# Patient Record
Sex: FEMALE | HISPANIC OR LATINO | URBAN - METROPOLITAN AREA
[De-identification: names, ages, dates, MRNs, and addresses within clinical notes are randomized per-mention and may not be internally consistent; named-entity substitution may affect disease eponyms.]

---

## 2023-06-12 ENCOUNTER — TRANSCRIBE ORDERS (OUTPATIENT)
Dept: RADIOLOGY | Facility: HOSPITAL | Age: 53
End: 2023-06-12

## 2023-06-12 DIAGNOSIS — D25.9 UTERINE LEIOMYOMA, UNSPECIFIED LOCATION: ICD-10-CM

## 2023-06-12 DIAGNOSIS — N92.3 INTERMENSTRUAL HEAVY BLEEDING: Primary | ICD-10-CM

## 2023-06-27 ENCOUNTER — TELEPHONE (OUTPATIENT)
Dept: VASCULAR SURGERY | Facility: CLINIC | Age: 53
End: 2023-06-27

## 2023-06-29 ENCOUNTER — TELEMEDICINE (OUTPATIENT)
Dept: INTERVENTIONAL RADIOLOGY/VASCULAR | Facility: CLINIC | Age: 53
End: 2023-06-29
Payer: COMMERCIAL

## 2023-06-29 DIAGNOSIS — N93.9 ABNORMAL UTERINE BLEEDING DUE TO SUBMUCOUSAL LEIOMYOMA OF UTERUS: Primary | ICD-10-CM

## 2023-06-29 DIAGNOSIS — D25.0 ABNORMAL UTERINE BLEEDING DUE TO SUBMUCOUSAL LEIOMYOMA OF UTERUS: Primary | ICD-10-CM

## 2023-06-29 PROCEDURE — G2012 BRIEF CHECK IN BY MD/QHP: HCPCS | Performed by: INTERNAL MEDICINE

## 2023-06-29 RX ORDER — DEXAMETHASONE SODIUM PHOSPHATE 10 MG/ML
10 INJECTION, SOLUTION INTRAMUSCULAR; INTRAVENOUS ONCE
OUTPATIENT
Start: 2023-06-29 | End: 2023-06-29

## 2023-06-29 RX ORDER — KETOROLAC TROMETHAMINE 30 MG/ML
30 INJECTION, SOLUTION INTRAMUSCULAR; INTRAVENOUS ONCE
OUTPATIENT
Start: 2023-06-29 | End: 2023-06-29

## 2023-06-29 NOTE — LETTER
June 29, 2023     Kyle Cardona MD  [address]    Patient: Cori Madison   YOB: 1970   Date of Visit: 6/29/2023       Dear Dr Denice Benjamin: Thank you for referring Cori Madison to me for evaluation  Below are my notes for this consultation  If you have questions, please do not hesitate to call me  I look forward to following your patient along with you  Sincerely,        Susanne Mukherjee MD        CC: No Recipients    Susanne Mukherjee MD  6/29/2023 10:46 AM  Incomplete  Virtual Brief Visit    This Visit is being completed by telephone  The Patient is located at Home and in the following state in which I hold an active license [place]    The patient was identified by name and date of birth  Cori Madison was informed that this is a telemedicine visit and that the visit is being conducted through Telephone  My office door was closed  No one else was in the room  She acknowledged consent and understanding of privacy and security of the video platform  The patient has agreed to participate and understands they can discontinue the visit at any time  Patient is aware this is a billable service  Assessment/Plan:  Patient is a 48year-old female with history of abnormal bleeding referred by her ob/gyn for uterine fibroid embolization  Patient states having bleeding most days throughout the past year, which range from spotting to occasional heavy bleeding  Patient states having bulk symptoms of occasional sharp pelvic pain and constipation  She denies any urinary frequency  Otherwise, the patient states she feels like her usual self and denies any symptoms  Patient has never been pregnant  The benefits, risks, and alternatives of uterine fibroid embolization was described to the patient  The risks of bleeding, infection, sloughing of fibroid into the endometrial cavity, and post-procedural pain was described   Patient was told that it may take weeks to months to see full benefits from uterine artery embolization  The alternatives of hysterectomy and conservative management were also discussed with the patient  Patient wishes to proceed with the procedure  Due to longevity of the abnormal uterine bleeding in the past year, further workup for endometrial cancer was recommended to the patient  I will contact the referring ob/gyn to see if a pap smear and endometrial biopsy was ever performed  The recent prior pelvic ultrasound report was reviewed, however, the images were not available  Regardless, a pelvic MRI will need to be performed to further evaluate for endometrial or uterine cancer prior to the embolization procedure, as well as to look at the enhancement of the fibroid  Given the length of time to the next available procedure time in 1-2 months, I will schedule the patient while we await further work-up  Latvian interpretation was utilized during this discussion (interpretor #[id number])  Please contact me via e-mail at [email or web address] if there are any questions or concerns  Yoly Lawrence MD  Interventional Radiology  Problem List Items Addressed This Visit    None      Recent Visits  Date Type Provider Dept   06/27/23 Telephone [facility] recent visits within past 7 days and meeting all other requirements  Future Appointments  No visits were found meeting these conditions  Showing future appointments within next 150 days and meeting all other requirements         Visit Time  Total Visit Duration: 30 minutes             Janis Arce MD  6/29/2023  9:27 AM  Sign when Signing Visit    Virtual Brief Visit    This Visit is being completed by telephone  The Patient is located at Home and in the following state in which I hold an active license [place]    The patient was identified by name and date of birth   Jeremiah Eastonn was informed that this is a telemedicine visit and that the visit is being conducted through Telephone  My office door was closed  No one else was in the room  She acknowledged consent and understanding of privacy and security of the video platform  The patient has agreed to participate and understands they can discontinue the visit at any time  Patient is aware this is a billable service  Assessment/Plan:  Patient is a 48year-old female with history of abnormal bleeding referred by her ob/gyn for uterine fibroid embolization  Patient states having bleeding most days throughout the past year, which range from spotting to occasional heavy bleeding  Patient states having bulk symptoms of occasional sharp pelvic pain and constipation  She denies any urinary frequency  Otherwise, the patient states she feels like her usual self and denies any symptoms  Patient has never been pregnant  The benefits, risks, and alternatives of uterine fibroid embolization was described to the patient  The risks of bleeding, infection, sloughing of fibroid into the endometrial cavity, and post-procedural pain was described  Patient was told that it may take weeks to months to see full benefits from uterine artery embolization  The alternatives of hysterectomy and conservative management were also discussed with the patient  Patient wishes to proceed with the procedure  Due to longevity of the abnormal uterine bleeding in the past year, further workup for endometrial cancer was recommended to the patient  I will contact the referring ob/gyn to see if a pap smear and endometrial biopsy was ever performed  The recent prior pelvic ultrasound report was reviewed, however, the images were not available  Regardless, a pelvic MRI will need to be performed to further evaluate for endometrial or uterine cancer prior to the embolization procedure  Gogo Wright MD  Interventional Radiology      Problem List Items Addressed This Visit    None      Recent Visits  Date Type Provider Dept   06/27/23 Telephone [facility]   Showing recent visits within past 7 days and meeting all other requirements  Future Appointments  No visits were found meeting these conditions    Showing future appointments within next 150 days and meeting all other requirements         Visit Time  Total Visit Duration: 30 minutes

## 2023-06-29 NOTE — PROGRESS NOTES
Virtual Brief Visit    This Visit is being completed by telephone  The Patient is located at Home and in the following state in which I hold an active license [place]    The patient was identified by name and date of birth  Echo Dumas was informed that this is a telemedicine visit and that the visit is being conducted through Telephone  My office door was closed  No one else was in the room  She acknowledged consent and understanding of privacy and security of the video platform  The patient has agreed to participate and understands they can discontinue the visit at any time  Patient is aware this is a billable service  Assessment/Plan:  Patient is a 48year-old female with history of abnormal bleeding referred by her ob/gyn for uterine fibroid embolization  Patient states having bleeding most days throughout the past year, which range from spotting to occasional heavy bleeding  Patient states having bulk symptoms of occasional sharp pelvic pain and constipation  She denies any urinary frequency  Otherwise, the patient states she feels like her usual self and denies any symptoms  Patient has never been pregnant  The benefits, risks, and alternatives of uterine fibroid embolization was described to the patient  The risks of bleeding, infection, sloughing of fibroid into the endometrial cavity, and post-procedural pain was described  Patient was told that it may take weeks to months to see full benefits from uterine artery embolization  The alternatives of hysterectomy and conservative management were also discussed with the patient  Patient wishes to proceed with the procedure  Due to longevity of the abnormal uterine bleeding in the past year, further workup for endometrial cancer was recommended to the patient  I will contact the referring ob/gyn to see if a pap smear and endometrial biopsy was ever performed   The recent prior pelvic ultrasound report was reviewed, however, the images were not available  Regardless, a pelvic MRI will need to be performed to further evaluate for endometrial or uterine cancer prior to the embolization procedure, as well as to look at the enhancement of the fibroid  Given the length of time to the next available procedure time in 1-2 months, I will schedule the patient while we await further work-up  Lao interpretation was utilized during this discussion (interpretor #[id number])  Please contact me via e-mail at [email or web address] if there are any questions or concerns  Mirella Lakhani MD  Interventional Radiology  Problem List Items Addressed This Visit    None      Recent Visits  Date Type Provider Dept   06/27/23 Telephone [facility] recent visits within past 7 days and meeting all other requirements  Future Appointments  No visits were found meeting these conditions    Showing future appointments within next 150 days and meeting all other requirements         Visit Time  Total Visit Duration: 30 minutes

## 2023-07-16 ENCOUNTER — HOSPITAL ENCOUNTER (OUTPATIENT)
Dept: MRI IMAGING | Facility: HOSPITAL | Age: 53
Discharge: HOME | End: 2023-07-16
Attending: INTERNAL MEDICINE
Payer: COMMERCIAL

## 2023-07-16 DIAGNOSIS — D25.0 ABNORMAL UTERINE BLEEDING DUE TO SUBMUCOUSAL LEIOMYOMA OF UTERUS: ICD-10-CM

## 2023-07-16 DIAGNOSIS — N93.9 ABNORMAL UTERINE BLEEDING DUE TO SUBMUCOUSAL LEIOMYOMA OF UTERUS: ICD-10-CM

## 2023-07-16 PROCEDURE — 72197 MRI PELVIS W/O & W/DYE: CPT

## 2023-07-16 PROCEDURE — A9585 GADOBUTROL INJECTION: HCPCS | Performed by: INTERNAL MEDICINE

## 2023-07-16 PROCEDURE — G1004 CDSM NDSC: HCPCS

## 2023-07-16 RX ADMIN — GADOBUTROL 6 ML: 604.72 INJECTION INTRAVENOUS at 13:58

## 2023-07-24 ENCOUNTER — TELEPHONE (OUTPATIENT)
Dept: INTERVENTIONAL RADIOLOGY/VASCULAR | Facility: HOSPITAL | Age: 53
End: 2023-07-24

## 2023-07-24 ENCOUNTER — TELEPHONE (OUTPATIENT)
Dept: INTERVENTIONAL RADIOLOGY/VASCULAR | Facility: CLINIC | Age: 53
End: 2023-07-24

## 2023-07-24 DIAGNOSIS — D25.0 ABNORMAL UTERINE BLEEDING DUE TO SUBMUCOUSAL LEIOMYOMA OF UTERUS: Primary | ICD-10-CM

## 2023-07-24 DIAGNOSIS — N93.9 ABNORMAL UTERINE BLEEDING DUE TO SUBMUCOUSAL LEIOMYOMA OF UTERUS: Primary | ICD-10-CM

## 2023-07-24 NOTE — TELEPHONE ENCOUNTER
Interventional Radiology Note    Attempt was made to call patient x2 today regarding pelvic MRI results. I left voicemail to patient stating that she needs to have an endometrial biopsy performed prior to any fibroid embolization procedure. An e-mail was also sent to the patient through the listed address in the medical records in both [address] [place] [address] and Romansh translations.  A referral was placed to the [facility] ob/gyn service in case she wants procedure performed through our hospital.    Bibiana Schulz MD  Interventional Radiology

## 2023-10-27 ENCOUNTER — TELEPHONE (OUTPATIENT)
Dept: INTERVENTIONAL RADIOLOGY/VASCULAR | Facility: HOSPITAL | Age: 53
End: 2023-10-27

## 2023-10-30 ENCOUNTER — HOSPITAL ENCOUNTER (OUTPATIENT)
Dept: INTERVENTIONAL RADIOLOGY/VASCULAR | Facility: HOSPITAL | Age: 53
Discharge: HOME | End: 2023-10-30
Attending: INTERNAL MEDICINE
Payer: COMMERCIAL

## 2023-10-30 VITALS
TEMPERATURE: 97.1 F | HEART RATE: 76 BPM | HEIGHT: 61 IN | DIASTOLIC BLOOD PRESSURE: 64 MMHG | RESPIRATION RATE: 18 BRPM | SYSTOLIC BLOOD PRESSURE: 165 MMHG | OXYGEN SATURATION: 98 % | BODY MASS INDEX: 23.6 KG/M2 | WEIGHT: 125 LBS

## 2023-10-30 DIAGNOSIS — D25.0 ABNORMAL UTERINE BLEEDING DUE TO SUBMUCOUSAL LEIOMYOMA OF UTERUS: Primary | ICD-10-CM

## 2023-10-30 DIAGNOSIS — N93.9 ABNORMAL UTERINE BLEEDING DUE TO SUBMUCOUSAL LEIOMYOMA OF UTERUS: Primary | ICD-10-CM

## 2023-10-30 LAB
ANION GAP SERPL CALCULATED.3IONS-SCNC: 8 MMOL/L
BUN SERPL-MCNC: 15 MG/DL (ref 5–25)
CALCIUM SERPL-MCNC: 9.4 MG/DL (ref 8.4–10.2)
CHLORIDE SERPL-SCNC: 103 MMOL/L (ref 96–108)
CO2 SERPL-SCNC: 26 MMOL/L (ref 21–32)
CREAT SERPL-MCNC: 0.4 MG/DL (ref 0.6–1.3)
ERYTHROCYTE [DISTWIDTH] IN BLOOD BY AUTOMATED COUNT: 12 % (ref 11.6–15.1)
EXT PREGNANCY TEST URINE: NEGATIVE
EXT. CONTROL: NORMAL
GFR SERPL CREATININE-BSD FRML MDRD: 119 ML/MIN/1.73SQ M
GLUCOSE P FAST SERPL-MCNC: 111 MG/DL (ref 65–99)
GLUCOSE SERPL-MCNC: 111 MG/DL (ref 65–140)
HCT VFR BLD AUTO: 44.4 % (ref 34.8–46.1)
HGB BLD-MCNC: 14.4 G/DL (ref 11.5–15.4)
INR PPP: 0.86 (ref 0.84–1.19)
MCH RBC QN AUTO: 29 PG (ref 26.8–34.3)
MCHC RBC AUTO-ENTMCNC: 32.4 G/DL (ref 31.4–37.4)
MCV RBC AUTO: 90 FL (ref 82–98)
PLATELET # BLD AUTO: 218 THOUSANDS/UL (ref 149–390)
PMV BLD AUTO: 10.2 FL (ref 8.9–12.7)
POTASSIUM SERPL-SCNC: 4 MMOL/L (ref 3.5–5.3)
PROTHROMBIN TIME: 12.2 SECONDS (ref 11.6–14.5)
RBC # BLD AUTO: 4.96 MILLION/UL (ref 3.81–5.12)
SODIUM SERPL-SCNC: 137 MMOL/L (ref 135–147)
WBC # BLD AUTO: 7.82 THOUSAND/UL (ref 4.31–10.16)

## 2023-10-30 PROCEDURE — C1769 GUIDE WIRE: HCPCS

## 2023-10-30 PROCEDURE — C1887 CATHETER, GUIDING: HCPCS

## 2023-10-30 PROCEDURE — 37243 VASC EMBOLIZE/OCCLUDE ORGAN: CPT

## 2023-10-30 PROCEDURE — 37243 VASC EMBOLIZE/OCCLUDE ORGAN: CPT | Performed by: INTERNAL MEDICINE

## 2023-10-30 PROCEDURE — 77001 FLUOROGUIDE FOR VEIN DEVICE: CPT

## 2023-10-30 PROCEDURE — 80048 BASIC METABOLIC PNL TOTAL CA: CPT | Performed by: INTERNAL MEDICINE

## 2023-10-30 PROCEDURE — 99152 MOD SED SAME PHYS/QHP 5/>YRS: CPT

## 2023-10-30 PROCEDURE — C1894 INTRO/SHEATH, NON-LASER: HCPCS

## 2023-10-30 PROCEDURE — 85027 COMPLETE CBC AUTOMATED: CPT | Performed by: INTERNAL MEDICINE

## 2023-10-30 PROCEDURE — 99153 MOD SED SAME PHYS/QHP EA: CPT

## 2023-10-30 PROCEDURE — 76937 US GUIDE VASCULAR ACCESS: CPT | Performed by: INTERNAL MEDICINE

## 2023-10-30 PROCEDURE — 81025 URINE PREGNANCY TEST: CPT | Performed by: INTERNAL MEDICINE

## 2023-10-30 PROCEDURE — 85610 PROTHROMBIN TIME: CPT | Performed by: INTERNAL MEDICINE

## 2023-10-30 PROCEDURE — C1760 CLOSURE DEV, VASC: HCPCS

## 2023-10-30 PROCEDURE — 76937 US GUIDE VASCULAR ACCESS: CPT

## 2023-10-30 PROCEDURE — 36247 INS CATH ABD/L-EXT ART 3RD: CPT | Performed by: INTERNAL MEDICINE

## 2023-10-30 RX ORDER — KETOROLAC TROMETHAMINE 30 MG/ML
30 INJECTION, SOLUTION INTRAMUSCULAR; INTRAVENOUS ONCE
Status: DISCONTINUED | OUTPATIENT
Start: 2023-10-30 | End: 2023-10-31 | Stop reason: HOSPADM

## 2023-10-30 RX ORDER — KETOROLAC TROMETHAMINE 30 MG/ML
INJECTION, SOLUTION INTRAMUSCULAR; INTRAVENOUS AS NEEDED
Status: COMPLETED | OUTPATIENT
Start: 2023-10-30 | End: 2023-10-30

## 2023-10-30 RX ORDER — OXYCODONE HYDROCHLORIDE AND ACETAMINOPHEN 5; 325 MG/1; MG/1
1 TABLET ORAL EVERY 8 HOURS PRN
Qty: 9 TABLET | Refills: 0 | Status: SHIPPED | OUTPATIENT
Start: 2023-10-30 | End: 2023-11-02

## 2023-10-30 RX ORDER — DEXAMETHASONE SODIUM PHOSPHATE 10 MG/ML
10 INJECTION, SOLUTION INTRAMUSCULAR; INTRAVENOUS ONCE
Status: COMPLETED | OUTPATIENT
Start: 2023-10-30 | End: 2023-10-30

## 2023-10-30 RX ORDER — MIDAZOLAM HYDROCHLORIDE 2 MG/2ML
INJECTION, SOLUTION INTRAMUSCULAR; INTRAVENOUS AS NEEDED
Status: COMPLETED | OUTPATIENT
Start: 2023-10-30 | End: 2023-10-30

## 2023-10-30 RX ORDER — SODIUM CHLORIDE 9 MG/ML
INJECTION, SOLUTION INTRAVENOUS
Status: COMPLETED | OUTPATIENT
Start: 2023-10-30 | End: 2023-10-30

## 2023-10-30 RX ORDER — CEFAZOLIN SODIUM 1 G/50ML
1000 SOLUTION INTRAVENOUS ONCE
Status: COMPLETED | OUTPATIENT
Start: 2023-10-30 | End: 2023-10-30

## 2023-10-30 RX ORDER — OXYCODONE HYDROCHLORIDE 5 MG/1
5 TABLET ORAL EVERY 4 HOURS PRN
Status: DISCONTINUED | OUTPATIENT
Start: 2023-10-30 | End: 2023-10-31 | Stop reason: HOSPADM

## 2023-10-30 RX ORDER — KETOROLAC TROMETHAMINE 10 MG/1
10 TABLET, FILM COATED ORAL EVERY 8 HOURS
Qty: 2 TABLET | Refills: 0 | Status: SHIPPED | OUTPATIENT
Start: 2023-10-30 | End: 2023-10-31

## 2023-10-30 RX ORDER — LIDOCAINE HCL/PF 100 MG/5ML
SYRINGE (ML) INJECTION AS NEEDED
Status: COMPLETED | OUTPATIENT
Start: 2023-10-30 | End: 2023-10-30

## 2023-10-30 RX ORDER — ONDANSETRON 2 MG/ML
4 INJECTION INTRAMUSCULAR; INTRAVENOUS EVERY 6 HOURS PRN
Status: DISCONTINUED | OUTPATIENT
Start: 2023-10-30 | End: 2023-10-31 | Stop reason: HOSPADM

## 2023-10-30 RX ORDER — LIDOCAINE HCL/PF 100 MG/5ML
100 SYRINGE (ML) INJECTION ONCE
Status: DISCONTINUED | OUTPATIENT
Start: 2023-10-30 | End: 2023-10-30 | Stop reason: ALTCHOICE

## 2023-10-30 RX ORDER — FENTANYL CITRATE 50 UG/ML
INJECTION, SOLUTION INTRAMUSCULAR; INTRAVENOUS AS NEEDED
Status: COMPLETED | OUTPATIENT
Start: 2023-10-30 | End: 2023-10-30

## 2023-10-30 RX ADMIN — IOHEXOL 112 ML: 350 INJECTION, SOLUTION INTRAVENOUS at 12:36

## 2023-10-30 RX ADMIN — FENTANYL CITRATE 25 MCG: 50 INJECTION, SOLUTION INTRAMUSCULAR; INTRAVENOUS at 11:02

## 2023-10-30 RX ADMIN — LIDOCAINE HYDROCHLORIDE 2 ML: 20 INJECTION INTRAVENOUS at 11:56

## 2023-10-30 RX ADMIN — LIDOCAINE HYDROCHLORIDE 2 ML: 20 INJECTION INTRAVENOUS at 11:35

## 2023-10-30 RX ADMIN — FENTANYL CITRATE 25 MCG: 50 INJECTION, SOLUTION INTRAMUSCULAR; INTRAVENOUS at 11:37

## 2023-10-30 RX ADMIN — CEFAZOLIN SODIUM 1000 MG: 1 SOLUTION INTRAVENOUS at 10:45

## 2023-10-30 RX ADMIN — FENTANYL CITRATE 50 MCG: 50 INJECTION, SOLUTION INTRAMUSCULAR; INTRAVENOUS at 10:51

## 2023-10-30 RX ADMIN — KETOROLAC TROMETHAMINE 30 MG: 30 INJECTION, SOLUTION INTRAMUSCULAR; INTRAVENOUS at 11:36

## 2023-10-30 RX ADMIN — SODIUM CHLORIDE 75 ML/HR: 0.9 INJECTION, SOLUTION INTRAVENOUS at 11:02

## 2023-10-30 RX ADMIN — FENTANYL CITRATE 25 MCG: 50 INJECTION, SOLUTION INTRAMUSCULAR; INTRAVENOUS at 12:10

## 2023-10-30 RX ADMIN — MIDAZOLAM 0.5 MG: 1 INJECTION INTRAMUSCULAR; INTRAVENOUS at 11:37

## 2023-10-30 RX ADMIN — FENTANYL CITRATE 50 MCG: 50 INJECTION, SOLUTION INTRAMUSCULAR; INTRAVENOUS at 12:04

## 2023-10-30 RX ADMIN — MIDAZOLAM 1 MG: 1 INJECTION INTRAMUSCULAR; INTRAVENOUS at 10:51

## 2023-10-30 RX ADMIN — MIDAZOLAM 0.5 MG: 1 INJECTION INTRAMUSCULAR; INTRAVENOUS at 11:02

## 2023-10-30 RX ADMIN — DEXAMETHASONE SODIUM PHOSPHATE 10 MG: 10 INJECTION, SOLUTION INTRAMUSCULAR; INTRAVENOUS at 10:59

## 2023-10-30 NOTE — LETTER
[facility] INTERVENTIONAL RADIOLOGY  [address] [place] [id number]  No information on file. October 30, 2023     Patient: Scott Lyle   YOB: 1970   Date of Visit: 10/30/2023       To Whom it May Concern:    Scott Lyle is under my professional care. She was seen in the hospital from 10/30/2023 to 10/30/23. She may return to work on 11/2/2023 without limitations. If you have any questions or concerns, please don't hesitate to call.          Sincerely,          Jagdeep Hanson MD

## 2023-10-30 NOTE — DISCHARGE INSTRUCTIONS
Uterine Fibroids and Uterine Artery Embolization    WHAT YOU SHOULD KNOW:   Uterine fibroids are growths found inside your uterus (womb). Uterine fibroids also may be called tumors (lumps) or leiomyomas. Uterine fibroids often appear in groups, or you may have only one. They can be small or large, and they can grow in size. They are almost always benign (not cancer) and likely will not spread to other parts of your body. AFTER YOU LEAVE:     Medicines:    Pain medicine: You may be given medicine to take away or decrease pain. Do not wait until the pain is severe before you take your medicine. Take Toradol on schedule until finished, then switch to ibuprofen if desired. Do not take tylenol with Percoset. Take your medicine as directed:  Call your caregiver if you think your medicine is not helping or if you have side effects    To avoid narcotic related constipation: Take an over the counter stool softener, such as Colace, 100 mg twice a day, or whatever you typically prefer. Drink plenty of fluids. Resume your normal diet. Small sips of flat soda will help with nausea. Follow up with your GYN as directed. Avoid heavy lifting: You will need to avoid lifting heavy objects for 3 days. This helps prevent injury to your puncture in the groin. May return to normal activity as tolerated. WOUND CARE     Remove band aid/ dressing tomorrow. You may shower 24 hours after your procedure. Shower and wash groin area or wrist area gently with soap and water: beginning tomorrow. Rinse and pat Dry. Apply new water seal band aid. Repeat this process for 5 days. If there is any drainage from the puncture site, you should put on a clean bandage. No Powders, creams, lotions or antibiotic ointments for 5 days. No tub baths, hot tubs or swimming for 5 days. Also: Drink lots of fluids and try to move around as much as possible to avoid blood clots forming in your legs.     Contact Interventional Radiology at [phone]  if:  You have a foul smelling discharge from the vagina. If you have fever over 101, with or without chills. Persistent nausea or vomiting. You have any questions about your condition or care. You have increased vaginal bleeding, pelvic pain, or pelvic pressure. These symptoms will occur, but should get better over 3-5 days, not worse. Seek immediate care or call 911 if:   Your heart begins to race, and you feel faint. Your leg feels warm, tender, and painful. It may look swollen and red. You feel lightheaded, short of breath, and have chest pain.

## 2023-10-31 ENCOUNTER — TELEPHONE (OUTPATIENT)
Dept: INTERVENTIONAL RADIOLOGY/VASCULAR | Facility: CLINIC | Age: 53
End: 2023-10-31

## 2023-10-31 NOTE — TELEPHONE ENCOUNTER
Interventional Radiology Note    Patient was contacted via phone today to follow-up after uterine fibroid embolization procedure. Patient states she had some pain overnight which was well controlled with the prescribed Toradol. Patient had some nausea, but it has now resolved. She denies any other symptoms at this time. I will follow-up with the patient in 1 month to reassess her symptoms.     [facility] MD  Interventional Radiology